# Patient Record
Sex: MALE | Race: BLACK OR AFRICAN AMERICAN | Employment: OTHER | URBAN - METROPOLITAN AREA
[De-identification: names, ages, dates, MRNs, and addresses within clinical notes are randomized per-mention and may not be internally consistent; named-entity substitution may affect disease eponyms.]

---

## 2022-07-26 ENCOUNTER — APPOINTMENT (OUTPATIENT)
Dept: PHYSICAL THERAPY | Age: 62
End: 2022-07-26

## 2022-07-27 ENCOUNTER — HOSPITAL ENCOUNTER (OUTPATIENT)
Dept: PHYSICAL THERAPY | Age: 62
Discharge: HOME | End: 2022-07-27
Payer: OTHER GOVERNMENT

## 2022-07-27 PROCEDURE — 97140 MANUAL THERAPY 1/> REGIONS: CPT

## 2022-07-27 PROCEDURE — 97110 THERAPEUTIC EXERCISES: CPT

## 2022-07-27 PROCEDURE — 97161 PT EVAL LOW COMPLEX 20 MIN: CPT

## 2022-07-27 NOTE — PROGRESS NOTES
In [address] [place] [address], [address] [place] [address], [address] [place] [address],[address] 300 [phone] fax      Plan of Care/ Statement of Necessity for Physical Therapy Services    Patient name: Delbert Willis Start of Care: 2022   Referral source: Niurka Naik : 1960    Medical Diagnosis: Pain in right hip [M25.551]  Payor:  / Plan: Philip Blum 74 / Product Type:  /  Onset Date: May 2022   Treatment Diagnosis: (B) hip pain    Prior Hospitalization: see medical history Provider#: [id number]   Medications: Verified on Patient summary List    Comorbidities: Per patient: alcohol use    Prior Level of Function: Patient stated he works out 6-7 days/week that incorporates stretching, core stability, and UE/LE resistance training. The Plan of Care and following information is based on the information from the initial evaluation. Assessment/ key information: Patient presents with (B) hip pain (right>left) that began approximately May 2022 with an insidious onset. Patient stated he took a couple weeks off from working out so pain has gotten a little better since onset. Patient describes hip pain as intermittent aching and is located at right iliac crest and posterior hip. Patient stated he experiences the pain with the initial transition from sitting to stand, but once he begins to walk pain eases, and also with the initial turning in bed but then again the pain eases. Patient denies numbness/tingling and no dysfunction with bowel or bladder. Patient exhibits decreased right LE flexibility, decreased right Hip IR/ER AROM, increased discomfort at right PSIS with prone HS curl and left hip extension, and decreased glut strength. With pelvic alignment assessment noted a right anterior innominate rotation that was addressed with use of a muscle energy technique. Patient demonstrates potential to make functional gains within a reasonable time frame. Patient would benefit from skilled PT to address above deficits and assist with return to PLOF. Due to high copay patient was interested in doing PT 1x/week at this time. Plan to update HEP regularly so patient can continue to progress at home. Evaluation Complexity History LOW Complexity : Zero comorbidities / personal factors that will impact the outcome / POC; Examination LOW Complexity : 1-2 Standardized tests and measures addressing body structure, function, activity limitation and / or participation in recreation  ;Presentation LOW Complexity : Stable, uncomplicated  ;Clinical Decision Making MEDIUM Complexity : FOTO score of 26-74  Overall Complexity Rating: LOW   Problem List: pain affecting function, decrease ROM, decrease strength, impaired gait/ balance, decrease ADL/ functional abilitiies, decrease activity tolerance, decrease flexibility/ joint mobility, and decrease transfer abilities   Treatment Plan may include any combination of the following: Therapeutic exercise, Therapeutic activities, Neuromuscular re-education, Physical agent/modality, Gait/balance training, Manual therapy, Patient education, Self Care training, Functional mobility training, and Stair training  Patient / Family readiness to learn indicated by: asking questions, trying to perform skills, and interest  Persons(s) to be included in education: patient (P)  Barriers to Learning/Limitations: None  Patient Goal (s): Identify problem and corrective action  Patient Self Reported Health Status: good  Rehabilitation Potential: good    Short Term Goals: To be accomplished in 2 weeks:   1. Patient will be independent and compliant with HEP 1-2x/day to increase ease with ADls safely. Eval: HEP established    2. Patient will improve Right hip AROM ER/IR to 30deg to assist with return to PLOF. Eval: Right hip AROM ER:25deg IR:20deg   Long Term Goals: To be accomplished in 4 weeks:   1.  Patient will improve FOTO to at least 75 to demonstrate improved function. Eval: FOTO: 58   2. Patient will report being able to transition to a standing position and turn in bed without onset of pain to increase ease and safety with transfers. Eval: Patient reports pain with transition to standing position and turning in bed   3. Patient will improve Right hip extension strength to 4/5 to increase safety with household management tasks. Eval: right hip extension strength: 4-/5   Frequency / Duration: Patient to be seen 1-2 times per week for 6weeks. Patient/ Caregiver education and instruction: Diagnosis, prognosis, exercises   [x]  Plan of care has been reviewed with ROSSI Rasheed, PT 7/27/2022 12:12 PM    ________________________________________________________________________    I certify that the above Therapy Services are being furnished while the patient is under my care. I agree with the treatment plan and certify that this therapy is necessary.     Physician's Signature:____________Date:_________TIME:________     Kyle Canchola*  ** Signature, Date and Time must be completed for valid certification **    Please sign and return to In [address] [place] [address], [address] [place] [address], [address] [place] [address],[facility] 300  [phone] fax

## 2022-07-27 NOTE — PROGRESS NOTES
PT DAILY TREATMENT NOTE/HIP [id number]    Patient Name: Mandi Sanchez  Date:2022  : 1960  [x]  Patient  Verified  Payor:  / Plan: Bibiana Omer / Product Type:  /    In time:12:12  Out time:1:15  Total Treatment Time (min): 61  Visit #: 1 of     Treatment Area: Pain in right hip [M25.551]    SUBJECTIVE  Pain Level (0-10 scale): 0/10  []constant [x]intermittent []improving []worsening []no change since onset    Any medication changes, allergies to medications, adverse drug reactions, diagnosis change, or new procedure performed?: [x] No    [] Yes (see summary sheet for update)  Subjective functional status/changes:     PLOF: Patient stated he works out 6-7 days/week that incorporates stretching, core stability, and UE/LE resistance training. Limitations to PLOF: pain, decreased flexibility, pelvic alignment issues   Mechanism of Injury: Patient presents with (B) hip pain (right>left) that began approximately May 2022 with an insidious onset. Patient stated he took a couple weeks off from working out so pain has gotten a little better since onset. Current symptoms/Complaints: Patient describes hip pain as intermittent aching and is located at right iliac crest and posterior hip. Patient stated he experiences the pain with the initial transition from sitting to stand, but once he begins to walk pain eases, and also with the initial turning in bed but then again the pain eases. Patient denies numbness/tingling and no dysfunction with bowel or bladder   Previous Treatment/Compliance: no prior PT for hip, had PT in past for plantar fasciitis. No recent imaging. PMHx/Surgical Hx: patient confirmed: No previous back/hip/knee/ankle/foot surg. No pacemaker, no cardiac issue, no Cancer. Work Hx: n/a   Living Situation: stairs in home, and able to use a reciprocal gait without pain. Pt Goals: in chart.    Cognition: A & O x 2    Other:    OBJECTIVE/EXAMINATION      28 min [x]Eval                  []Re-Eval       25 min Therapeutic Exercise:  [x] See flow sheet : HEP creation and review; stand: minisquat: 5x, Hip 3-way-10x, Piriformis stretch-30sec, Right hip flexor stretch-30sec, dynamic HS stretch-5x, Bridge-10x, prone HS curl-5x    Rationale: increase ROM and increase strength to improve the patients ability to perform ADLs safely. 10 min Manual Therapy:  Pelvic alignment assessment and 2 cycles of MET to address right anterior innominate rotation. The manual therapy interventions were performed at a separate and distinct time from the therapeutic activities interventions. Rationale: decrease pain and increase ROM to perform ADLs safely. With   [x] TE   [] TA   [] neuro   [] other: Patient Education: [x] Review HEP    [] Progressed/Changed HEP based on:   [] positioning   [] body mechanics   [] transfers   [] heat/ice application    [x] other:  used a model to educate patient about Sacroiliac joint and innominate rotation that could be contributing to right posterior hip pain. Also reviewed patient's gym activities and to avoid exercises/activities that cause increased pain.       Other Objective/Functional Measures:     Physical Therapy Evaluation- Hip    Posture:    Gait:  [x] Normal    [] Abnormal    [] Antalgic    [] NWB    Device:    Describe:         ROM/Strength        AROM                     PROM        Strength (1-5)  Hip Left Right Left Right Left Right   Flexion     4+ 4+   Extension     4- 4-   Abduction      4-   Glut max     4- 4-   ER 30 25       IR 28 20       Knee Left Right Left Right Left Right   Extension     5 5   Flexion     5 5        Flexibility: [] Unable to assess at this time  Hamstrings:    (L) Tightness= [] WNL   [] Min   [] Mod   [] Severe    (R) Tightness= [] WNL   [] Min   [x] Mod   [] Severe  Quadriceps:    (L) Tightness= [] WNL   [] Min   [] Mod   [] Severe    (R) Tightness= [] WNL   [] Min   [x] Mod   [] Severe Optional Tests  Bruce/ Ruchi Test: [x] Neg- tight    [] Pos  Josse Test:  [] Neg    [x] Pos- right  Trendelenberg:  [x] Neg    [] Pos [] Left    [] Right  Ely's Test:  [] Neg    [x] Pos  Piriformis Test:  [] Neg    [] Pos- tightness present     Other tests/ comments:   Iliac crests are symmetrical in standing. Slight tension reported in right low back with right slump test   Right Anterior innominate rotation noted in supine. Patient reported discomfort at right PSIS region with right HS curl and left hip extension. Patient has tendency to compensate with quad with sidelying hip abduction on the right. Pain Level (0-10 scale) post treatment: 0/10     ASSESSMENT/Changes in Function: See POC. Advised patient to perform HEP gently and to stop if pain increases. Patient reported feeling better at end of the evaluation. Due to high copay patient was interested in doing PT 1x/week at this time. Plan to update HEP regularly so patient can continue to progress at home. Patient will continue to benefit from skilled PT services to modify and progress therapeutic interventions, address functional mobility deficits, address ROM deficits, address strength deficits, analyze and address soft tissue restrictions, analyze and cue movement patterns, analyze and modify body mechanics/ergonomics, assess and modify postural abnormalities, and address imbalance/dizziness to attain remaining goals.      [x]  See Plan of Care  []  See progress note/recertification  []  See Discharge Summary         Progress towards goals / Updated goals:  See POC    PLAN  []  Upgrade activities as tolerated     [x]  Continue plan of care  []  Update interventions per flow sheet       []  Discharge due to:_  []  Other:_      Jonathan Nichole, PT 7/27/2022  12:12 PM

## 2022-08-10 ENCOUNTER — HOSPITAL ENCOUNTER (OUTPATIENT)
Dept: PHYSICAL THERAPY | Age: 62
Discharge: HOME | End: 2022-08-10
Payer: OTHER GOVERNMENT

## 2022-08-10 PROCEDURE — 97140 MANUAL THERAPY 1/> REGIONS: CPT

## 2022-08-10 PROCEDURE — 97112 NEUROMUSCULAR REEDUCATION: CPT

## 2022-08-10 PROCEDURE — 97110 THERAPEUTIC EXERCISES: CPT

## 2022-08-10 PROCEDURE — 97530 THERAPEUTIC ACTIVITIES: CPT

## 2022-08-10 NOTE — PROGRESS NOTES
PT DAILY TREATMENT NOTE     Patient Name: Gem Lei  Date:8/10/2022  : 1960  [x]  Patient  Verified  Payor:  / Plan: Philip Blum 74 / Product Type:  /    In time:901  Out time:939  Total Treatment Time (min): 38  Visit #: 2 of 12       Treatment Area: Pain in right hip [M25.551]    SUBJECTIVE  Pain Level (0-10 scale): 0  Any medication changes, allergies to medications, adverse drug reactions, diagnosis change, or new procedure performed?: [x] No    [] Yes (see summary sheet for update)  Subjective functional status/changes:   [] No changes reported  \"I have been trying to do some of the exercises she gave me. \"    OBJECTIVE    Modality rationale:      Min Type Additional Details    [] Estim:  []Unatt       []IFC  []Premod                        []Other:  []w/ice   []w/heat  Position:  Location:    [] Estim: []Att    []TENS instruct  []NMES                    []Other:  []w/US   []w/ice   []w/heat  Position:  Location:    []  Traction: [] Cervical       []Lumbar                       [] Prone          []Supine                       []Intermittent   []Continuous Lbs:  [] before manual  [] after manual    []  Ultrasound: []Continuous   [] Pulsed                           []1MHz   []3MHz W/cm2:  Location:    []  Iontophoresis with dexamethasone         Location: [] Take home patch   [] In clinic   PD []  Ice     []  heat  []  Ice massage  []  Laser   []  Anodyne Position:  Location:    []  Laser with stim  []  Other:  Position:  Location:    []  Vasopneumatic Device    []  Right     []  Left  Pre-treatment girth:  Post-treatment girth:  Measured at (location):  Pressure:       [] lo [] med [] hi   Temperature: [] lo [] med [] hi   [] Skin assessment post-treatment:  []intact []redness- no adverse reaction    []redness - adverse reaction:          14 min Therapeutic Exercise:  [x] See flow sheet :   Rationale: increase ROM, increase strength, and improve coordination to improve the patients ability to tolerate ADLS and activities    8 min Therapeutic Activity:  [x]  See flow sheet :   Rationale: increase ROM, increase strength, and improve coordination  to improve the patients ability to tolerate ADLs and activities      8 min Neuromuscular Re-education:  [x]  See flow sheet :   Rationale: increase ROM, increase strength, and improve coordination  to improve the patients ability to tolerate ADLs and activities       8 min Manual Therapy:  alignment check with LAD left LE   The manual therapy interventions were performed at a separate and distinct time from the therapeutic activities interventions. Rationale: decrease pain, increase ROM, and increase tissue extensibility to tolerate ADLS and activities        With   [x] TE   [x] TA   [x] neuro   [] other: Patient Education: [x] Review HEP    [x] Progressed/Changed HEP based on:   [] positioning   [] body mechanics   [] transfers   [] heat/ice application    [] other:      Other Objective/Functional Measures: VC exercises and technique     Pain Level (0-10 scale) post treatment: 0    ASSESSMENT/Changes in Function: first full day back . Tolerated well with noted decrease ROM left > right hip rotators    Patient will continue to benefit from skilled PT services to modify and progress therapeutic interventions, address ROM deficits, address strength deficits, analyze and address soft tissue restrictions, analyze and cue movement patterns, analyze and modify body mechanics/ergonomics, assess and modify postural abnormalities, and instruct in home and community integration to attain remaining goals. [x]  See Plan of Care  []  See progress note/recertification  []  See Discharge Summary         Progress towards goals / Updated goals:  Short Term Goals: To be accomplished in 2 weeks:              1. Patient will be independent and compliant with HEP 1-2x/day to increase ease with ADls safely.               Eval: HEP established  CURRENT compliance initiated 8/10/22              2. Patient will improve Right hip AROM ER/IR to 30deg to assist with return to PLOF. Eval: Right hip AROM ER:25deg IR:20deg  CURRENT ONGOING NA 8/10/22  Long Term Goals: To be accomplished in 4 weeks:              1. Patient will improve FOTO to at least 75 to demonstrate improved function. Eval: FOTO: 58              2. Patient will report being able to transition to a standing position and turn in bed without onset of pain to increase ease and safety with transfers. Eval: Patient reports pain with transition to standing position and turning in bed              3. Patient will improve Right hip extension strength to 4/5 to increase safety with household management tasks.               Eval: right hip extension strength: 4-/5    PLAN  [x]  Upgrade activities as tolerated     [x]  Continue plan of care  []  Update interventions per flow sheet       []  Discharge due to:_  []  Other:_      Ashley Holland, PT 8/10/2022  9:14 AM    Future Appointments   Date Time Provider Neel Rhoades   8/16/2022  8:15 AM Karen Bhandari, PT [facility]   8/23/2022 11:15 AM Karen Bhandari PT [facility]   8/30/2022 11:15 AM Karen Bhandari, PT [facility]

## 2022-08-15 ENCOUNTER — HOSPITAL ENCOUNTER (OUTPATIENT)
Dept: PHYSICAL THERAPY | Age: 62
Discharge: HOME | End: 2022-08-15
Payer: OTHER GOVERNMENT

## 2022-08-15 PROCEDURE — 97110 THERAPEUTIC EXERCISES: CPT | Performed by: PHYSICAL THERAPIST

## 2022-08-15 PROCEDURE — 97112 NEUROMUSCULAR REEDUCATION: CPT | Performed by: PHYSICAL THERAPIST

## 2022-08-15 PROCEDURE — 97530 THERAPEUTIC ACTIVITIES: CPT | Performed by: PHYSICAL THERAPIST

## 2022-08-15 NOTE — PROGRESS NOTES
PT DAILY TREATMENT NOTE     Patient Name: Sophia Garibay  Date:8/15/2022  : 1960  [x]  Patient  Verified  Payor: SHEILA / Plan: Philip Blum 74 / Product Type:  /    In time:1:31P  Out time:2:13P  Total Treatment Time (min): 42min  Visit #: 3 of 12    Treatment Area: Pain in right hip [M25.551]    SUBJECTIVE  Pain Level (0-10 scale): 0/10  Any medication changes, allergies to medications, adverse drug reactions, diagnosis change, or new procedure performed?: [x] No    [] Yes (see summary sheet for update)  Subjective functional status/changes:   [] No changes reported  Patient reports he felt onset of pain when walking his brisk walk, 1/2 mile into his loop. Once stopped walking intensely, the pain dissipated. Does not feel there is rhyme or reason to his pain, but he is going to do the therapy. Never stopped working out since his initial visit. OBJECTIVE    23 min Therapeutic Exercise:  [x] See flow sheet :   Rationale: increase ROM and increase strength to improve the patients A/ROM and decrease pain with movement during functional activities    10 min Therapeutic Activity:  [x]  See flow sheet :   Rationale: increase strength and improve coordination  to improve the patients ability to perform functional tasks such as overhead reach. 9 min Neuromuscular Re-education:  [x]  See flow sheet :   Rationale: improve coordination, improve balance, and increase proprioception  to improve the patients ability to perform activities with good form and proprioception with tactile and verbal cuing appropriately. With   [x] TE   [x] TA   [x] neuro   [] other: Patient Education: [x] Review HEP    [x] Progressed/Changed HEP based on:   [x] positioning   [] body mechanics   [] transfers   [] heat/ice application    [] other:      Other Objective/Functional Measures: Able to perform Step ups on Bosu ball without loss of balance and no onset of pain.      Pain Level (0-10 scale) post treatment: 0/10    ASSESSMENT/Changes in Function: Patient is progressing towards goals, though continues to demonstrate improved mobility. Continues to need verbal cues for exercise completion and proper form and positioning. Patient will continue to benefit from skilled PT services to modify and progress therapeutic interventions, address functional mobility deficits, address ROM deficits, address strength deficits, analyze and address soft tissue restrictions, analyze and cue movement patterns, analyze and modify body mechanics/ergonomics, assess and modify postural abnormalities, address imbalance/dizziness, and instruct in home and community integration to attain remaining goals. [x]  See Plan of Care  []  See progress note/recertification  []  See Discharge Summary         Progress towards goals / Updated goals:  Short Term Goals: To be accomplished in 2 weeks:              1. Patient will be independent and compliant with HEP 1-2x/day to increase ease with ADls safely. Eval: HEP established  CURRENT compliance initiated 8/10/22              2. Patient will improve Right hip AROM ER/IR to 30deg to assist with return to PLOF. Eval: Right hip AROM ER:25deg IR:20deg  CURRENT ONGOING NA 8/10/22  Long Term Goals: To be accomplished in 4 weeks:              1. Patient will improve FOTO to at least 75 to demonstrate improved function. Eval: FOTO: 58              2. Patient will report being able to transition to a standing position and turn in bed without onset of pain to increase ease and safety with transfers. Eval: Patient reports pain with transition to standing position and turning in bed              3. Patient will improve Right hip extension strength to 4/5 to increase safety with household management tasks.               Eval: right hip extension strength: 4-/5    PLAN  [x]  Upgrade activities as tolerated     [x]  Continue plan of care  []  Update interventions per flow sheet       []  Discharge due to:_  []  Other:_      Eda Jeans, PT 8/15/2022  1:40 PM    Future Appointments   Date Time Provider Neel Rhoades   8/23/2022 11:15 AM Karen Bhandari, PT [facility]   8/30/2022 11:15 AM Karen Bhandari, PT [facility]

## 2022-08-16 ENCOUNTER — APPOINTMENT (OUTPATIENT)
Dept: PHYSICAL THERAPY | Age: 62
End: 2022-08-16
Payer: OTHER GOVERNMENT

## 2022-08-23 ENCOUNTER — HOSPITAL ENCOUNTER (OUTPATIENT)
Dept: PHYSICAL THERAPY | Age: 62
Discharge: HOME | End: 2022-08-23
Payer: OTHER GOVERNMENT

## 2022-08-23 PROCEDURE — 97530 THERAPEUTIC ACTIVITIES: CPT

## 2022-08-23 PROCEDURE — 97110 THERAPEUTIC EXERCISES: CPT

## 2022-08-23 PROCEDURE — 97112 NEUROMUSCULAR REEDUCATION: CPT

## 2022-08-23 NOTE — PROGRESS NOTES
PT DAILY TREATMENT NOTE     Patient Name: Gem Lei  Date:2022  : 1960  [x]  Patient  Verified  Payor: SHEILA / Plan: Philip Blum 74 / Product Type:  /    In time:1130  Out time:1205  Total Treatment Time (min): 35  Visit #: 4 of 12       Treatment Area: Pain in right hip [M25.551]    SUBJECTIVE  Pain Level (0-10 scale): 0  Any medication changes, allergies to medications, adverse drug reactions, diagnosis change, or new procedure performed?: [x] No    [] Yes (see summary sheet for update)  Subjective functional status/changes:   [] No changes reported  \"I am feeling well. I have not had the pain on the right side since the last time I came here. \"    OBJECTIVE       12 min Therapeutic Exercise:  [x] See flow sheet :   Rationale: increase ROM, increase strength, improve coordination, improve balance, and increase proprioception to improve the patients ability to tolerate ADLs and activities    13 min Therapeutic Activity:  [x]  See flow sheet :   Rationale: increase ROM, increase strength, improve coordination, improve balance, and increase proprioception  to improve the patients ability to tolerate ADLS and activities     10 min Neuromuscular Re-education:  [x]  See flow sheet :   Rationale: increase ROM, increase strength, improve coordination, improve balance, and increase proprioception  to improve the patients ability to tolerate ADLs and activities     Rationale:           With   [x] TE   [x] TA   [x] neuro   [] other: Patient Education: [x] Review HEP    [x] Progressed/Changed HEP based on:   [] positioning   [] body mechanics   [] transfers   [] heat/ice application    [] other:      Other Objective/Functional Measures: VC exercises and technique   Increased 3 way hip (12X) and mini squats (15x) to green band  Added bosu lunges 15X each      Pain Level (0-10 scale) post treatment: 0    ASSESSMENT/Changes in Function: tolerated well with reports of improvement since beginning PT. Tolerated increase to green band with no increase pain, showing increase strength. Decrease pain, improved FOTO, increase tolerance to progression of exercises showing a + response to PT. Patient will continue to benefit from skilled PT services to modify and progress therapeutic interventions, address ROM deficits, address strength deficits, analyze and address soft tissue restrictions, analyze and cue movement patterns, analyze and modify body mechanics/ergonomics, assess and modify postural abnormalities, and instruct in home and community integration to attain remaining goals. [x]  See Plan of Care  []  See progress note/recertification  []  See Discharge Summary         Progress towards goals / Updated goals:  Short Term Goals: To be accomplished in 2 weeks:              1. Patient will be independent and compliant with HEP 1-2x/day to increase ease with ADls safely. Eval: HEP established  CURRENT compliance initiated 8/23/22              2. Patient will improve Right hip AROM ER/IR to 30deg to assist with return to PLOF. Eval: Right hip AROM ER:25deg IR:20deg  CURRENT ER 45   IR 38  8/23/22  Long Term Goals: To be accomplished in 4 weeks:              1. Patient will improve FOTO to at least 75 to demonstrate improved function. Eval: FOTO: 58   CURRENT  65 8/23/22              2. Patient will report being able to transition to a standing position and turn in bed without onset of pain to increase ease and safety with transfers. Eval: Patient reports pain with transition to standing position and turning in bed   CURRENT \"no problem\" with turning in bed and getting in bed 8/23/22              3. Patient will improve Right hip extension strength to 4/5 to increase safety with household management tasks.               Eval: right hip extension strength: 4-/5    CURRENT ongoing NA  8/23/22    PLAN  [x]  Upgrade activities as tolerated [x]  Continue plan of care  []  Update interventions per flow sheet       []  Discharge due to:_  []  Other:_      Eugene Cote, PT 8/23/2022  11:31 AM    Future Appointments   Date Time Provider Neel Rhoades   8/30/2022 11:15 AM Clemencia Whitney, PT [facility]

## 2022-08-23 NOTE — PROGRESS NOTES
[facility] [address], [address] [place] [address], [address] [place] [address],[address] [phone] fax    Physician Update  [x] Progress Note  [] Discharge Summary  Patient name: Ronn Cm Start of Care: 22   Referral source: Blanca Miller : 1960   Medical/Treatment Diagnosis: Pain in right hip [M25.551]  Payor:  / Plan: Gayle Crocker / Product Type: Laverna Tim /  Onset Date:  May 2022     Prior Hospitalization: see medical history Provider#: [id number]   Medications: Verified on Patient Summary List     Comorbidities: Per patient: alcohol use    Prior Level of Function: Patient stated he works out 6-7 days/week that incorporates stretching, core stability, and UE/LE resistance training. Visits from Start of Care: 4    Missed Visits: 0    Status at Evaluation/Last Progress Note: Patient presents with (B) hip pain (right>left) that began approximately May 2022 with an insidious onset. Patient stated he took a couple weeks off from working out so pain has gotten a little better since onset. Patient describes hip pain as intermittent aching and is located at right iliac crest and posterior hip. Patient stated he experiences the pain with the initial transition from sitting to stand, but once he begins to walk pain eases, and also with the initial turning in bed but then again the pain eases. Patient denies numbness/tingling and no dysfunction with bowel or bladder. Patient exhibits decreased right LE flexibility, decreased right Hip IR/ER AROM, increased discomfort at right PSIS with prone HS curl and left hip extension, and decreased glut strength. With pelvic alignment assessment noted a right anterior innominate rotation that was addressed with use of a muscle energy technique  Progress towards Goals:   Short Term Goals:  To be accomplished in 2 weeks:              1. Patient will be independent and compliant with HEP 1-2x/day to increase ease with ADls safely. Eval: HEP established  CURRENT compliance initiated 8/23/22              2. Patient will improve Right hip AROM ER/IR to 30deg to assist with return to PLOF. Eval: Right hip AROM ER:25deg IR:20deg  CURRENT ER 45   IR 38  8/23/22  Long Term Goals: To be accomplished in 4 weeks:              1. Patient will improve FOTO to at least 75 to demonstrate improved function. Eval: FOTO: 58              CURRENT  65 8/23/22              2. Patient will report being able to transition to a standing position and turn in bed without onset of pain to increase ease and safety with transfers. Eval: Patient reports pain with transition to standing position and turning in bed              CURRENT \"no problem\" with turning in bed and getting in bed 8/23/22              3. Patient will improve Right hip extension strength to 4/5 to increase safety with household management tasks. Eval: right hip extension strength: 4-/5               CURRENT ongoing NA  8/23/22  Goals: to be achieved in 4 weeks:       1. Patient will improve FOTO to at least 75 to demonstrate improved function. PN   65 8/23/22                    2. Patient will improve Right hip extension strength to 4/5 to increase safety with household management tasks. PN ongoing NA  8/23/22   3 patient will tolerate TM . 25 miles and no increase pain for carryover to improved tolerance to community activities   PN not initiated   4 patient will have tolerance to stoop and lift 15# 5x and no increase pain for carryover to home and community activities   PN not initiated   5 patient will tolerate front carry 15-20# 50'x2 with no increase pain for carryover to home and community activities   PN Not initiated  ASSESSMENT/RECOMMENDATIONS:  [x]Continue therapy per initial plan/protocol at a frequency of  2 x per week for 4 weeks  []Continue therapy with the following recommended changes:_____________________      _____________________________________________________________________  []Discontinue therapy progressing towards or have reached established goals  []Discontinue therapy due to lack of appreciable progress towards goals  []Discontinue therapy due to lack of attendance or compliance  []Await Physician's recommendations/decisions regarding therapy  []Other:________________________________________________________________    Thank you for this referral. Deirdre Chung, PT 8/23/2022 11:35 AM  NOTE TO PHYSICIAN:  Vernell Haywood [address]   FAX TO [facility]: [phone]  If you are unable to process this request in 24 hours please contact our office: [phone]    I have read the above report and request that my patient continue as recommended. I have read the above report and request that my patient continue therapy with the following changes/special instructions:_____________________________________  I have read the above report and request that my patient be discharged from therapy.     Physician's Signature:____________Date:_________TIME:________     Maria Isabel Meneses*  ** Signature, Date and Time must be completed for valid certification **

## 2022-08-30 ENCOUNTER — APPOINTMENT (OUTPATIENT)
Dept: PHYSICAL THERAPY | Age: 62
End: 2022-08-30
Payer: OTHER GOVERNMENT

## 2022-09-12 ENCOUNTER — TELEPHONE (OUTPATIENT)
Dept: PHYSICAL THERAPY | Age: 62
End: 2022-09-12

## 2022-09-12 ENCOUNTER — APPOINTMENT (OUTPATIENT)
Dept: PHYSICAL THERAPY | Age: 62
End: 2022-09-12

## 2022-09-22 ENCOUNTER — TELEPHONE (OUTPATIENT)
Dept: PHYSICAL THERAPY | Age: 62
End: 2022-09-22

## 2022-09-26 NOTE — PROGRESS NOTES
In [address] [place] [address], [address] [place] [address], [address] [place], [place] [address]y [address]  [phone] fax    Discharge Summary    Patient name: Caleb Fox     Start of Care: 22  Referral source: Ernesto Dietz*    : 1960  Medical/Treatment Diagnosis: Pain in right hip [M25.551]  Payor: SHEILA / Plan: Philip Blum 74 / Product Type: Fronie Goldmann /        Onset Date:May 2022  Prior Hospitalization: see medical history   Provider#: [id number]  Comorbidities: Per patient: alcohol use    Prior Level of Function: Patient stated he works out 6-7 days/week that incorporates stretching, core stability, and UE/LE resistance training. Medications: Verified on Patient Summary List    Visits from Start of Care: 4    Missed Visits: 2  Reporting Period : 22 to 22      Summary of Care:please see latest MD note for specifics. He did not return after 22 session. Unable to reach as voicemail is not set up. Thank you. Short Term Goals: To be accomplished in 2 weeks:              1. Patient will be independent and compliant with HEP 1-2x/day to increase ease with ADls safely. Eval: HEP established  CURRENT compliance initiated 22              2. Patient will improve Right hip AROM ER/IR to 30deg to assist with return to PLOF. Eval: Right hip AROM ER:25deg IR:20deg  CURRENT ER 45   IR 38  22  Long Term Goals: To be accomplished in 4 weeks:              1. Patient will improve FOTO to at least 75 to demonstrate improved function. Eval: FOTO: 58              CURRENT  65 22              2. Patient will report being able to transition to a standing position and turn in bed without onset of pain to increase ease and safety with transfers.               Eval: Patient reports pain with transition to standing position and turning in bed              CURRENT \"no problem\" with turning in bed and getting in bed 8/23/22              3. Patient will improve Right hip extension strength to 4/5 to increase safety with household management tasks. Eval: right hip extension strength: 4-/5               CURRENT ongoing NA  8/23/22  ASSESSMENT/RECOMMENDATIONS:  []Discontinue therapy progressing towards or have reached established goals  []Discontinue therapy due to lack of appreciable progress towards goals  []Discontinue therapy due to lack of attendance or compliance  [x]Other:no further contact, UA to leave message,  not set up.     Thank you for this referral.     Brenda Hooks, PT 9/26/2022 10:13 AM

## 2025-08-28 ENCOUNTER — OFFICE VISIT (OUTPATIENT)
Age: 65
End: 2025-08-28

## 2025-08-28 VITALS — WEIGHT: 157 LBS | BODY MASS INDEX: 24.64 KG/M2 | HEIGHT: 67 IN

## 2025-08-28 DIAGNOSIS — M65.321 TRIGGER INDEX FINGER OF RIGHT HAND: Primary | ICD-10-CM

## 2025-08-28 DIAGNOSIS — M79.644 PAIN OF FINGER OF RIGHT HAND: ICD-10-CM

## 2025-08-28 RX ORDER — ERGOCALCIFEROL 1.25 MG/1
50000 CAPSULE, LIQUID FILLED ORAL
COMMUNITY

## 2025-08-28 RX ORDER — LIDOCAINE HYDROCHLORIDE 10 MG/ML
0.5 INJECTION, SOLUTION INFILTRATION; PERINEURAL ONCE
Status: COMPLETED | OUTPATIENT
Start: 2025-08-28 | End: 2025-08-28

## 2025-08-28 RX ORDER — DICLOFENAC SODIUM 75 MG/1
75 TABLET, DELAYED RELEASE ORAL
COMMUNITY
Start: 2025-08-22

## 2025-08-28 RX ORDER — SODIUM PHOSPHATE,MONO-DIBASIC 19G-7G/118
ENEMA (ML) RECTAL
COMMUNITY

## 2025-08-28 RX ORDER — TRIAMCINOLONE ACETONIDE 10 MG/ML
5 INJECTION, SUSPENSION INTRA-ARTICULAR; INTRALESIONAL ONCE
Status: COMPLETED | OUTPATIENT
Start: 2025-08-28 | End: 2025-08-28

## 2025-08-28 RX ORDER — B-COMPLEX WITH VITAMIN C
1 TABLET ORAL
COMMUNITY

## 2025-08-28 RX ADMIN — LIDOCAINE HYDROCHLORIDE 0.5 ML: 10 INJECTION, SOLUTION INFILTRATION; PERINEURAL at 11:22

## 2025-08-28 RX ADMIN — TRIAMCINOLONE ACETONIDE 5 MG: 10 INJECTION, SUSPENSION INTRA-ARTICULAR; INTRALESIONAL at 11:23
